# Patient Record
Sex: FEMALE | Race: OTHER | Employment: FULL TIME | URBAN - METROPOLITAN AREA
[De-identification: names, ages, dates, MRNs, and addresses within clinical notes are randomized per-mention and may not be internally consistent; named-entity substitution may affect disease eponyms.]

---

## 2017-08-04 ENCOUNTER — HOSPITAL ENCOUNTER (OUTPATIENT)
Dept: PREADMISSION TESTING | Age: 57
Discharge: HOME | End: 2017-08-04
Payer: COMMERCIAL

## 2017-08-04 LAB
HCT VFR BLD AUTO: 42.7 % (ref 35–45)
HGB BLD-MCNC: 14.6 G/DL (ref 12–16)
POTASSIUM SERPL-SCNC: 4.9 MMOL/L (ref 3.5–5.5)

## 2017-08-04 PROCEDURE — 84132 ASSAY OF SERUM POTASSIUM: CPT | Performed by: OBSTETRICS & GYNECOLOGY

## 2017-08-04 PROCEDURE — 85018 HEMOGLOBIN: CPT | Performed by: OBSTETRICS & GYNECOLOGY

## 2017-08-04 PROCEDURE — 36415 COLL VENOUS BLD VENIPUNCTURE: CPT | Performed by: OBSTETRICS & GYNECOLOGY

## 2017-08-07 LAB
FAX TO INFO,FAXT: NORMAL
FAX TO NUMBER,FAXN: NORMAL

## 2017-08-08 ENCOUNTER — HOSPITAL ENCOUNTER (OUTPATIENT)
Dept: LAB | Age: 57
Discharge: HOME | End: 2017-08-08
Payer: COMMERCIAL

## 2017-08-08 PROCEDURE — 88305 TISSUE EXAM BY PATHOLOGIST: CPT | Performed by: OBSTETRICS & GYNECOLOGY

## 2018-05-31 PROBLEM — E78.00 HYPERCHOLESTEROLEMIA: Status: ACTIVE | Noted: 2018-05-31

## 2018-05-31 PROBLEM — D25.1 FIBROIDS, INTRAMURAL: Status: ACTIVE | Noted: 2018-05-31

## 2018-05-31 PROBLEM — E66.9 OBESITY (BMI 30.0-34.9): Status: ACTIVE | Noted: 2018-05-31

## 2018-10-28 ENCOUNTER — APPOINTMENT (OUTPATIENT)
Dept: GENERAL RADIOLOGY | Age: 58
End: 2018-10-28
Attending: EMERGENCY MEDICINE
Payer: COMMERCIAL

## 2018-10-28 ENCOUNTER — HOSPITAL ENCOUNTER (EMERGENCY)
Age: 58
Discharge: HOME | End: 2018-10-28
Attending: EMERGENCY MEDICINE
Payer: COMMERCIAL

## 2018-10-28 VITALS
TEMPERATURE: 98.2 F | DIASTOLIC BLOOD PRESSURE: 68 MMHG | WEIGHT: 177 LBS | HEIGHT: 63 IN | RESPIRATION RATE: 18 BRPM | HEART RATE: 52 BPM | OXYGEN SATURATION: 98 % | BODY MASS INDEX: 31.36 KG/M2 | SYSTOLIC BLOOD PRESSURE: 126 MMHG

## 2018-10-28 DIAGNOSIS — R11.2 NON-INTRACTABLE VOMITING WITH NAUSEA, UNSPECIFIED VOMITING TYPE: Primary | ICD-10-CM

## 2018-10-28 DIAGNOSIS — R42 POSTURAL DIZZINESS WITH PRESYNCOPE: ICD-10-CM

## 2018-10-28 DIAGNOSIS — R55 POSTURAL DIZZINESS WITH PRESYNCOPE: ICD-10-CM

## 2018-10-28 LAB
ALBUMIN SERPL-MCNC: 3.8 G/DL (ref 3.4–5)
ALBUMIN/GLOB SERPL: 1.3 {RATIO} (ref 0.8–1.7)
ALP SERPL-CCNC: 50 U/L (ref 45–117)
ALT SERPL-CCNC: 28 U/L (ref 13–56)
ANION GAP SERPL CALC-SCNC: 13 MMOL/L (ref 3–18)
APPEARANCE UR: CLEAR
AST SERPL-CCNC: 21 U/L (ref 15–37)
BASOPHILS # BLD: 0 K/UL (ref 0–0.1)
BASOPHILS NFR BLD: 0 % (ref 0–2)
BILIRUB SERPL-MCNC: 0.4 MG/DL (ref 0.2–1)
BILIRUB UR QL: NEGATIVE
BUN SERPL-MCNC: 15 MG/DL (ref 7–18)
BUN/CREAT SERPL: 17
CALCIUM SERPL-MCNC: 8.4 MG/DL (ref 8.5–10.1)
CHLORIDE SERPL-SCNC: 105 MMOL/L (ref 100–108)
CK MB CFR SERPL CALC: 1.7 % (ref 0–4)
CK MB SERPL-MCNC: 4.8 NG/ML (ref 5–25)
CK SERPL-CCNC: 280 U/L (ref 26–192)
CO2 SERPL-SCNC: 22 MMOL/L (ref 21–32)
COLOR UR: YELLOW
CREAT SERPL-MCNC: 0.89 MG/DL (ref 0.6–1.3)
DIFFERENTIAL METHOD BLD: NORMAL
EOSINOPHIL # BLD: 0.1 K/UL (ref 0–0.4)
EOSINOPHIL NFR BLD: 1 % (ref 0–5)
ERYTHROCYTE [DISTWIDTH] IN BLOOD BY AUTOMATED COUNT: 12.3 % (ref 11.6–14.5)
FLUAV AG NPH QL IA: NEGATIVE
FLUBV AG NOSE QL IA: NEGATIVE
GLOBULIN SER CALC-MCNC: 2.9 G/DL (ref 2–4)
GLUCOSE SERPL-MCNC: 103 MG/DL (ref 74–99)
GLUCOSE UR STRIP.AUTO-MCNC: NEGATIVE MG/DL
HCG UR QL: NEGATIVE
HCT VFR BLD AUTO: 43.4 % (ref 35–45)
HGB BLD-MCNC: 14.8 G/DL (ref 12–16)
HGB UR QL STRIP: NEGATIVE
KETONES UR QL STRIP.AUTO: ABNORMAL MG/DL
LEUKOCYTE ESTERASE UR QL STRIP.AUTO: NEGATIVE
LYMPHOCYTES # BLD: 2.1 K/UL (ref 0.9–3.6)
LYMPHOCYTES NFR BLD: 27 % (ref 21–52)
MAGNESIUM SERPL-MCNC: 2.1 MG/DL (ref 1.6–2.6)
MCH RBC QN AUTO: 28.9 PG (ref 24–34)
MCHC RBC AUTO-ENTMCNC: 34.1 G/DL (ref 31–37)
MCV RBC AUTO: 84.8 FL (ref 74–97)
MONOCYTES # BLD: 0.4 K/UL (ref 0.05–1.2)
MONOCYTES NFR BLD: 6 % (ref 3–10)
NEUTS SEG # BLD: 5.4 K/UL (ref 1.8–8)
NEUTS SEG NFR BLD: 66 % (ref 40–73)
NITRITE UR QL STRIP.AUTO: NEGATIVE
PH UR STRIP: 7 [PH] (ref 5–8)
PLATELET # BLD AUTO: 169 K/UL (ref 135–420)
PMV BLD AUTO: 11.1 FL (ref 9.2–11.8)
POTASSIUM SERPL-SCNC: 3.6 MMOL/L (ref 3.5–5.5)
PROT SERPL-MCNC: 6.7 G/DL (ref 6.4–8.2)
PROT UR STRIP-MCNC: NEGATIVE MG/DL
RBC # BLD AUTO: 5.12 M/UL (ref 4.2–5.3)
SODIUM SERPL-SCNC: 140 MMOL/L (ref 136–145)
SP GR UR REFRACTOMETRY: 1.02 (ref 1–1.03)
TROPONIN I SERPL-MCNC: <0.02 NG/ML (ref 0–0.04)
UROBILINOGEN UR QL STRIP.AUTO: 0.2 EU/DL (ref 0.2–1)
WBC # BLD AUTO: 8 K/UL (ref 4.6–13.2)

## 2018-10-28 PROCEDURE — 74011250636 HC RX REV CODE- 250/636: Performed by: EMERGENCY MEDICINE

## 2018-10-28 PROCEDURE — 71046 X-RAY EXAM CHEST 2 VIEWS: CPT

## 2018-10-28 PROCEDURE — 99285 EMERGENCY DEPT VISIT HI MDM: CPT

## 2018-10-28 PROCEDURE — 82550 ASSAY OF CK (CPK): CPT | Performed by: EMERGENCY MEDICINE

## 2018-10-28 PROCEDURE — 85025 COMPLETE CBC W/AUTO DIFF WBC: CPT | Performed by: EMERGENCY MEDICINE

## 2018-10-28 PROCEDURE — 81003 URINALYSIS AUTO W/O SCOPE: CPT | Performed by: EMERGENCY MEDICINE

## 2018-10-28 PROCEDURE — 81025 URINE PREGNANCY TEST: CPT | Performed by: EMERGENCY MEDICINE

## 2018-10-28 PROCEDURE — 87804 INFLUENZA ASSAY W/OPTIC: CPT | Performed by: EMERGENCY MEDICINE

## 2018-10-28 PROCEDURE — 83735 ASSAY OF MAGNESIUM: CPT | Performed by: EMERGENCY MEDICINE

## 2018-10-28 PROCEDURE — 80053 COMPREHEN METABOLIC PANEL: CPT | Performed by: EMERGENCY MEDICINE

## 2018-10-28 PROCEDURE — 74019 RADEX ABDOMEN 2 VIEWS: CPT

## 2018-10-28 PROCEDURE — 96374 THER/PROPH/DIAG INJ IV PUSH: CPT

## 2018-10-28 PROCEDURE — 93005 ELECTROCARDIOGRAM TRACING: CPT

## 2018-10-28 PROCEDURE — 96361 HYDRATE IV INFUSION ADD-ON: CPT

## 2018-10-28 RX ORDER — ONDANSETRON 4 MG/1
4 TABLET, ORALLY DISINTEGRATING ORAL
Qty: 20 TAB | Refills: 0 | Status: SHIPPED | OUTPATIENT
Start: 2018-10-28

## 2018-10-28 RX ORDER — ONDANSETRON 2 MG/ML
4 INJECTION INTRAMUSCULAR; INTRAVENOUS ONCE
Status: COMPLETED | OUTPATIENT
Start: 2018-10-28 | End: 2018-10-28

## 2018-10-28 RX ADMIN — SODIUM CHLORIDE 1000 ML: 900 INJECTION, SOLUTION INTRAVENOUS at 15:11

## 2018-10-28 RX ADMIN — ONDANSETRON 4 MG: 2 INJECTION INTRAMUSCULAR; INTRAVENOUS at 15:11

## 2018-10-28 RX ADMIN — SODIUM CHLORIDE 1000 ML: 900 INJECTION, SOLUTION INTRAVENOUS at 16:53

## 2018-10-28 NOTE — LETTER
[facility] [place] 
THE [facility] EMERGENCY DEPT 
[place] [address] 
[phone] Work Note Date: 10/28/2018 To Whom It May concern: 
 
Davin Butler was seen and treated today in the emergency room by the following provider(s): 
Attending Provider: Kori Guan MD. Please excuse missed work. Davin Butler may return to work on 10/30/2018.  
 
Sincerely, 
 
 
 
 
Jeffrey Caruso MD

## 2018-10-28 NOTE — ED TRIAGE NOTES
Patient taken to [facility] via EMS fornear syncopal episode at work. Patient had sudden onset of dizziness and weakness with nausea and vomitting, a/o x3, patient is drowsy, denies pain

## 2018-10-28 NOTE — ED NOTES
Patient falling asleep o2 drops to 87%, 2L O2 applied to patient via nasal canula, patient O2 sat maintain 100% after oxygen applied

## 2018-10-28 NOTE — ED PROVIDER NOTES
EMERGENCY DEPARTMENT HISTORY AND PHYSICAL EXAM 
 
Date: 10/28/2018 Patient Name: Aristides Sahni History of Presenting Illness Chief Complaint Patient presents with  Dizziness History Provided By: Patient Chief Complaint: near syncope Duration: PTA Timing:  Acute Quality: dizziness, nausea, and vomiting Associated Symptoms: denies any other associated signs or symptoms Additional History (Context):  
2:40 PM  
Aristides Sahni is a 63 y.o. female with PMHX of HTN who presents to the emergency department via EMS C/O sudden onset of near syncope at work PTA. She denies fall or injury from this episode. The patient reports she suddenly had dizziness, nausea and vomiting. Pt denies LOC, headache, abdominal pain, and any other sxs or complaints. PCP: Homer Nielson MD 
 
Current Outpatient Medications Medication Sig Dispense Refill  ondansetron (ZOFRAN ODT) 4 mg disintegrating tablet Take 1 Tab by mouth every eight (8) hours as needed for Nausea. 20 Tab 0  
 atorvastatin (LIPITOR) 10 mg tablet TK 1 T PO QD  5  
 lisinopril (PRINIVIL, ZESTRIL) 20 mg tablet TK 1 T PO QD  5 Past History Past Medical History: 
Past Medical History:  
Diagnosis Date  
 HTN (hypertension)  Vaginal delivery 9510  Vaginal delivery 1988 Past Surgical History: 
Past Surgical History:  
Procedure Laterality Date  HX DILATION AND CURETTAGE  08/08/2017 [address] POL[address]PS Family History: 
Family History Problem Relation Age of Onset  Hypertension Mother [facility] Other Mother  Hypertension Maternal Grandmother Social History: 
Social History Tobacco Use  Smoking status: Never Smoker  Smokeless tobacco: Never Used Substance Use Topics  Alcohol use: No  
 Drug use: No  
 
 
Allergies: Allergies Allergen Reactions  Valsartan Shortness of Breath And itching Review of Systems Review of Systems Gastrointestinal: Positive for nausea and vomiting. Negative for abdominal pain. Neurological: Positive for dizziness. Negative for syncope and headaches. Near syncope All other systems reviewed and are negative. Physical Exam  
 
Vitals:  
 10/28/18 1703 10/28/18 1730 10/28/18 1731 10/28/18 1800 BP:  120/55  126/68 Pulse:  (!) 49  (!) 52 Resp:   15 18 Temp:      
SpO2: 99% 96%  98% Weight:      
Height:      
 
Physical Exam  
Nursing note and vitals reviewed. Vital signs and nursing notes reviewed CONSTITUTIONAL: Alert, well-developed; well-nourished. HEAD:  Normocephalic, atraumatic EYES: PERRL; EOM's intact. ENTM: Nose: no rhinorrhea; Throat: no erythema or exudate, mucous membranes moist 
Neck:  No JVD, supple without lymphadenopathy RESP: Chest clear, equal breath sounds. CV: Slow rate and rhythm; No murmurs, gallops or rubs. GI: Normal bowel sounds, abdomen soft and non-tender. No masses or organomegaly. Pt is actively vomiting in the room. : No costo-vertebral angle tenderness. BACK:  Non-tender UPPER EXT:  Normal inspection LOWER EXT: No edema, no calf tenderness. Distal pulses intact. NEURO: CN intact, reflexes 2/4 and sym, strength 5/5 and sym, sensation intact. SKIN: No rashes; Normal for age and stage. PSYCH:  Alert and oriented, normal affect. Diagnostic Study Results Labs - Recent Results (from the past 12 hour(s)) EKG, 12 LEAD, INITIAL Collection Time: 10/28/18  2:12 PM  
Result Value Ref Range Ventricular Rate 49 BPM  
 Atrial Rate 49 BPM  
 P-R Interval 162 ms QRS Duration 96 ms  
 Q-T Interval 462 ms QTC Calculation (Bezet) 417 ms Calculated P Axis 48 degrees Calculated R Axis 31 degrees Calculated T Axis 42 degrees Diagnosis Marked sinus bradycardia Abnormal ECG No previous ECGs available CBC WITH AUTOMATED DIFF Collection Time: 10/28/18  2:20 PM  
Result Value Ref Range WBC 8.0 4.6 - 13.2 K/uL  
 RBC 5.12 4.20 - 5.30 M/uL  
 HGB 14.8 12.0 - 16.0 g/dL HCT 43.4 35.0 - 45.0 % MCV 84.8 74.0 - 97.0 FL  
 MCH 28.9 24.0 - 34.0 PG  
 MCHC 34.1 31.0 - 37.0 g/dL  
 RDW 12.3 11.6 - 14.5 % PLATELET 486 468 - 934 K/uL MPV 11.1 9.2 - 11.8 FL  
 NEUTROPHILS 66 40 - 73 % LYMPHOCYTES 27 21 - 52 % MONOCYTES 6 3 - 10 % EOSINOPHILS 1 0 - 5 % BASOPHILS 0 0 - 2 %  
 ABS. NEUTROPHILS 5.4 1.8 - 8.0 K/UL  
 ABS. LYMPHOCYTES 2.1 0.9 - 3.6 K/UL  
 ABS. MONOCYTES 0.4 0.05 - 1.2 K/UL  
 ABS. EOSINOPHILS 0.1 0.0 - 0.4 K/UL  
 ABS. BASOPHILS 0.0 0.0 - 0.1 K/UL  
 DF AUTOMATED METABOLIC PANEL, COMPREHENSIVE Collection Time: 10/28/18  2:20 PM  
Result Value Ref Range Sodium 140 136 - 145 mmol/L Potassium 3.6 3.5 - 5.5 mmol/L Chloride 105 100 - 108 mmol/L  
 CO2 22 21 - 32 mmol/L Anion gap 13 3.0 - 18 mmol/L Glucose 103 (H) 74 - 99 mg/dL BUN 15 7.0 - 18 MG/DL Creatinine 0.89 0.6 - 1.3 MG/DL  
 BUN/Creatinine ratio 17 GFR est AA >60 >60 ml/min/1.73m2 GFR est non-AA >60 >60 ml/min/1.73m2 Calcium 8.4 (L) 8.5 - 10.1 MG/DL Bilirubin, total 0.4 0.2 - 1.0 MG/DL  
 ALT (SGPT) 28 13 - 56 U/L  
 AST (SGOT) 21 15 - 37 U/L Alk. phosphatase 50 45 - 117 U/L Protein, total 6.7 6.4 - 8.2 g/dL Albumin 3.8 3.4 - 5.0 g/dL Globulin 2.9 2.0 - 4.0 g/dL A-G Ratio 1.3 0.8 - 1.7 MAGNESIUM Collection Time: 10/28/18  2:20 PM  
Result Value Ref Range Magnesium 2.1 1.6 - 2.6 mg/dL CARDIAC PANEL,(CK, CKMB & TROPONIN) Collection Time: 10/28/18  2:20 PM  
Result Value Ref Range  (H) 26 - 192 U/L  
 CK - MB 4.8 (H) <3.6 ng/ml CK-MB Index 1.7 0.0 - 4.0 % Troponin-I, Qt. <0.02 0.0 - 0.045 NG/ML  
URINALYSIS W/ RFLX MICROSCOPIC Collection Time: 10/28/18  4:39 PM  
Result Value Ref Range Color YELLOW Appearance CLEAR Specific gravity 1.019 1.005 - 1.030    
 pH (UA) 7.0 5.0 - 8.0 Protein NEGATIVE  NEG mg/dL Glucose NEGATIVE  NEG mg/dL Ketone TRACE (A) NEG mg/dL Bilirubin NEGATIVE  NEG Blood NEGATIVE  NEG Urobilinogen 0.2 0.2 - 1.0 EU/dL Nitrites NEGATIVE  NEG Leukocyte Esterase NEGATIVE  NEG    
HCG URINE, QL Collection Time: 10/28/18  4:39 PM  
Result Value Ref Range HCG urine, QL NEGATIVE  NEG    
INFLUENZA A & B AG (RAPID TEST) Collection Time: 10/28/18  4:51 PM  
Result Value Ref Range Influenza A Antigen NEGATIVE  NEG Influenza B Antigen NEGATIVE  NEG Radiologic Studies -  
 
6:05 PM 
RADIOLOGY FINDINGS Chest X-ray shows NACPD Pending review by Radiologist 
Recorded by Mery Dickerson, ED Scribe, as dictated by Melvina Olguin MD  
 
6:05 PM 
RADIOLOGY FINDINGS Flat/Erect Abdomen X-ray shows no free air, no fluid levels. Mormal bowel gas pattern. Pending review by Radiologist 
Recorded by Mery Dickerson, ED Scribe, as dictated by Melvina Olguin MD  
 
XR CHEST PA LAT    (Results Pending) XR ABD FLAT/ ERECT    (Results Pending) CT Results  (Last 48 hours) None CXR Results  (Last 48 hours) None Medications given in the ED- Medications  
sodium chloride 0.9 % bolus infusion 1,000 mL (0 mL IntraVENous IV Completed 10/28/18 1611) ondansetron [facility]) injection 4 mg (4 mg IntraVENous Given 10/28/18 1511)  
sodium chloride 0.9 % bolus infusion 1,000 mL (0 mL IntraVENous IV Completed 10/28/18 1753) Medical Decision Making I am the first provider for this patient. I reviewed the vital signs, available nursing notes, past medical history, past surgical history, family history and social history. Vital Signs-Reviewed the patient's vital signs. Pulse Oximetry Analysis - 100% on room air Cardiac Monitor: 
Rate: 52 bpm 
Rhythm: Bradycardia EKG interpretation: (Preliminary) 2:12 PM  
Marked sinus bradycardia. Rate 49 bpm. No STEMI.  
EKG read by Melvina Olguin MD  
 
 Records Reviewed: Nursing Notes and Old Medical Records Provider Notes (Medical Decision Making): Ddx: Appendicitis, cholecystitis, peptic ulcer disease, enteritis, ACS, dehydration Procedures: 
Procedures ED Course:  
2:40 PM Initial assessment performed. The patients presenting problems have been discussed, and they are in agreement with the care plan formulated and outlined with them. I have encouraged them to ask questions as they arise throughout their visit. 4:37 PM The patient is still slightly nauseous. Her  voices concern about her blood pressure of 140/60. I explained that this is not abnormal.  
 
6:37 PM The patient feels better and her nausea is improved. She feels she is ready to go home. Diagnosis and Disposition DISCHARGE NOTE: 
6:39 PM  
Karlyn Brunner Marivel's  results have been reviewed with her. She has been counseled regarding her diagnosis, treatment, and plan. She verbally conveys understanding and agreement of the signs, symptoms, diagnosis, treatment and prognosis and additionally agrees to follow up as discussed. She also agrees with the care-plan and conveys that all of her questions have been answered. I have also provided discharge instructions for her that include: educational information regarding their diagnosis and treatment, and list of reasons why they would want to return to the ED prior to their follow-up appointment, should her condition change. She has been provided with education for proper emergency department utilization. Discussion: She further explained that she was nauseated, went outside, went to the bathroom, and after heaving in the bathroom, felt lightheaded. This is likely a viral enteritis which has caused a bit of dehydration and she had vasovagal syncope as a result of that. CLINICAL IMPRESSION: 
 
1. Non-intractable vomiting with nausea, unspecified vomiting type 2. Postural dizziness with presyncope PLAN: 
1. D/C Home 2.  
Current Discharge Medication List  
  
START taking these medications Details  
ondansetron (ZOFRAN ODT) 4 mg disintegrating tablet Take 1 Tab by mouth every eight (8) hours as needed for Nausea. Qty: 20 Tab, Refills: 0 CONTINUE these medications which have NOT CHANGED Details  
atorvastatin (LIPITOR) 10 mg tablet TK 1 T PO QD Refills: 5  
  
lisinopril (PRINIVIL, ZESTRIL) 20 mg tablet TK 1 T PO QD Refills: 5  
  
  
 
3. Follow-up Information Follow up With Specialties Details Why Contact Info Leonora Tadeo MD Internal Medicine Schedule an appointment as soon as possible for a visit in 2 days For primary care follow up [address] [place] [address] 
[phone] [facility] EMERGENCY DEPT Emergency Medicine  As needed, If symptoms worsen [address] [address] 
[phone]  
  
 
_______________________________ Attestations: This note is prepared by Sarita Navas, acting as Scribe for Nicole Noe MD. 
 
Nicole Noe MD:  The scribe's documentation has been prepared under my direction and personally reviewed by me in its entirety. I confirm that the note above accurately reflects all work, treatment, procedures, and medical decision making performed by me. 
_______________________________

## 2018-10-28 NOTE — DISCHARGE INSTRUCTIONS
Mareos: Instrucciones de cuidado - [ Dizziness: Care Instructions ]  Instrucciones de cuidado  Los mareos son Vinny Grebe sensación de inestabilidad o confusión en la yaneli. Son distintos al vértigo, roberta sensación de que la habitación gira o de que usted se mueve o . También es distinto del aturdimiento, que es la sensación de que está a punto de desmayarse. Puede resultar difícil conocer la causa de los [place]. Algunas personas se sienten mareadas cuando tienen migrañas. A veces, los episodios gripales pueden hacer que se sienta mareado. Algunas afecciones médicas, jose los problemas cardíacos o la presión arterial elodia, pueden hacer que se sienta mareado. Muchos medicamentos pueden causar mareos, jose los que se usan para la presión arterial elodia, el dolor o la ansiedad. Si es un medicamento el que está causando los síntomas, chen médico podría recomendarle que lo cambie o deje de tomarlo. Si es un problema cardíaco, podría necesitar medicamentos para ayudar a que chen corazón funcione mejor. Si no hay razón aparente para los síntomas, chen médico podría sugerir vigilar y esperar ashlee un tiempo para joao si los mareos desaparecen por sí solos. La atención de seguimiento es roberta parte clave de chen tratamiento y seguridad. Asegúrese de hacer y acudir a todas las citas, y llame a chen médico si está teniendo problemas. También es roberta buena idea saber los resultados de ev exámenes y mantener roberta lista de los medicamentos que dorinda. ¿Cómo puede cuidarse en el hogar? · Si chen médico le recomienda o receta medicamentos, tómelos exactamente según las indicaciones. Llame a chen médico si brandi estar teniendo un problema con chen medicamento. · No conduzca mientras se sienta mareado. · Trate de prevenir las caídas. Algunas medidas que puede saleem son:  ? Usar tapetes antideslizantes, agregar agarraderas cerca de la aric y usar luces nocturnas.   ? Ordenar chen casa de moe manera que en los senderos no haya nada con lo que se pueda tropezar. ? Avisarles a familiares y [address] [facility] que se ha estado sintiendo [place] les servirá para saber cómo ayudarle. ¿Cuándo debe pedir ayuda? Llame al 911 en cualquier momento que considere que necesita atención de [place]. Por ejemplo, llame si:    · Se desmayó (perdió el conocimiento).     · Tiene mareos junto con síntomas de un ataque cardíaco. Estos pueden incluir:  ? Dolor de [place], o presión o roberta sensación extraña en el pecho.  ? Sudoración. ? Falta de aire. ? Náuseas o vómito. ? Dolor, presión, o roberta sensación extraña en la espalda, el hoang, la mandíbula o el abdomen superior, o en drew o ambos hombros o brazos. ? Aturdimiento o debilidad repentina. ? Un latido cardíaco rápido o irregular.     · Tiene síntomas de un ataque cerebral. Estos pueden incluir:  ? Entumecimiento, hormigueo, debilidad o parálisis repentinos en la sera, el brazo o la pierna, sobre todo si ocurre en un solo lado del cuerpo. ? Cambios súbitos en la vista. ? Problemas repentinos para hablar. ? Confusión súbita o dificultad repentina para comprender frases sencillas. ? Problemas repentinos para caminar o mantener el equilibrio. ? Un dolor de yaneli intenso y repentino, distinto a los adrain de yaneli anteriores.    Llame a chen médico ahora mismo o busque atención médica inmediata si:    · Se siente mareado y tiene fiebre, dolor de yaneli o zumbido en los oídos.     · Tiene nuevas náuseas y vómito o estos aumentan.     · Julissa mareos no desaparecen o regresan.    Preste especial atención a los cambios en chen aline y asegúrese de comunicarse con chen médico si:    · No mejora jose se esperaba. ¿Dónde puede encontrar más información en inglés? Yudy Augustine a http://sabina-chidi.info/. Javan Brewer M166 en la búsqueda para aprender más acerca de \"Mareos: Instrucciones de cuidado - [ Dizziness: Care Instructions ]. \"  Revisado: 20 noviembre, 2017  Versión del contenido: 11.8  © 2175-6015 Healthwise, [place]. Las instrucciones de cuidado fueron adaptadas bajo licencia por [employer] (which disclaims liability or warranty for this information). Si usted tiene [place] afección médica o sobre estas instrucciones, siempre pregunte a chne profesional de aline. [facility] niega toda garantía o responsabilidad por chen uso de esta información. Náuseas y vómito: Instrucciones de cuidado - [ Nausea and Vomiting: Care Instructions ]  Instrucciones de cuidado    Cuando tiene náuseas, podría sentirse débil y sudoroso y notar mucha saliva en chen boca. Las náuseas suelen [employer]. La mayoría de las veces no hay que preocuparse por las náuseas y [address] [facility], ramone estos pueden ser signos de [place]. Dos causas comunes de náuseas y vómito son la gastroenteritis viral y la intoxicación por alimentos. Las náuseas y el vómito por gastroenteritis viral, por lo general, empiezan a mejorar en unas 24 horas. Las náuseas y el vómito debido a intoxicación por alimentos podrían durar de 12 a 48 horas. El médico lo ha revisado minuciosamente, ramone puede desarrollar problemas más tarde. Si nota algún problema o síntomas nuevos, busque tratamiento médico inmediatamente. La atención de seguimiento es roberta parte clave de chen tratamiento y seguridad. Asegúrese de hacer y acudir a todas las citas, y llame a chen médico si está teniendo problemas. También es roberta buena idea saber los resultados de ev exámenes y mantener roberta lista de los medicamentos que dorinda. ¿Cómo puede cuidarse en el hogar? · Para prevenir la deshidratación, jimi abundantes líquidos, suficientes para que chen orina sea de color amarillo jasmyne o kelsey jose el agua. Opte por beber agua y otros líquidos spenser sin cafeína hasta que se sienta mejor. Si tiene roberta enfermedad del riñón, del corazón o del hígado y tiene que [place]'s líquidos, hable con chen médico antes de aumentar chen consumo.   · Permanezca en la cama hasta que se sienta mejor. · Cuando pueda comer, empiece a consumir sopas claras (caldos), alimentos suaves y líquidos hasta que todos los síntomas hayan desaparecido por un período de 12 a 48 horas. Otras elecciones buenas incluyen pan rommel seco, galletas saladas, cereal cocido y postre de gelatina, jose Jell-O.  ¿Cuándo debe pedir ayuda? Llame al 911 toda vez que piense que puede necesitar atención de emergencia. Por ejemplo, llame si:    · Se desmayó (perdió el conocimiento).    Llame a chen médico ahora mismo o busque atención médica inmediata si:    · Tiene síntomas de deshidratación, tales jose:  ? Ojos secos y boca seca. ? Orina solo poca cantidad de color oscuro. ? Tiene más sed de lo normal.     · Tiene dolor abdominal nuevo o que empeora.     · Tiene fiebre nueva o que [place].     · Vomita real o algo parecido a granos de café molido.    Preste especial atención a los cambios en chen aline y asegúrese de comunicarse con chen médico si:    · Tiene náusea y vómito continuos.     · El vómito está empeorando.     · El vómito dura más de 2 días.     · No mejora jose se esperaba. ¿Dónde puede encontrar más información en inglés? Aubrey Allen a http://sabina-chidi.info/. Alley Venu H591 en la búsqueda para aprender más acerca de \"Náuseas y vómito: Instrucciones de cuidado - [ Nausea and Vomiting: Care Instructions ]. \"  Revisado: 20 noviembre, 2017  Versión del contenido: 11.8  © 3569-4666 Healthwise, Incorporated. Las instrucciones de cuidado fueron adaptadas bajo licencia por Good [facility] Connections (which disclaims liability or warranty for this information). Si usted tiene [place] afección médica o sobre estas instrucciones, siempre pregunte a chen profesional de aline. [facility] niega toda garantía o responsabilidad por chen uso de esta información.

## 2018-12-25 LAB
ATRIAL RATE: 49 BPM
CALCULATED P AXIS, ECG09: 48 DEGREES
CALCULATED R AXIS, ECG10: 31 DEGREES
CALCULATED T AXIS, ECG11: 42 DEGREES
DIAGNOSIS, 93000: NORMAL
P-R INTERVAL, ECG05: 162 MS
Q-T INTERVAL, ECG07: 462 MS
QRS DURATION, ECG06: 96 MS
QTC CALCULATION (BEZET), ECG08: 417 MS
VENTRICULAR RATE, ECG03: 49 BPM